# Patient Record
(demographics unavailable — no encounter records)

---

## 2024-12-24 NOTE — DISCUSSION/SUMMARY
[All Questions Answered] : Patient (and family) had all questions answered to an agreeable level of satisfaction [Interested in Proceeding] : Patient (and family) expressed understanding and interest in proceeding with the plan as outlined [de-identified] : Hopefully superficial wound in the area of the surgical incision. This did not seem to go deep, however, there is the possibility of risk for deep infection that would be a much more dangerous problem. I have started her on Duracef and I would like to see her again in 2 weeks to make sure this gets better. In the meantime, if she has any further pain or fevers I would have her go to the emergency department for initiation of IV antibiotics. She understands this is a delicate situation, especially as she had radiation prior to her surgery.  If imaging or pathology/biopsy was ordered, the patient was told to make an appointment to review findings right after all imaging is completed.   We discussed risks, benefits and alternatives. Rationale of care was reviewed and all questions were answered.

## 2024-12-24 NOTE — REASON FOR VISIT
[FreeTextEntry1] : Skin wound in the setting of: 7/27/2023 - Left proximal femoral replacement for metastatic breast cancer.

## 2024-12-24 NOTE — DISCUSSION/SUMMARY
[All Questions Answered] : Patient (and family) had all questions answered to an agreeable level of satisfaction [Interested in Proceeding] : Patient (and family) expressed understanding and interest in proceeding with the plan as outlined [de-identified] : Hopefully superficial wound in the area of the surgical incision. This did not seem to go deep, however, there is the possibility of risk for deep infection that would be a much more dangerous problem. I have started her on Duracef and I would like to see her again in 2 weeks to make sure this gets better. In the meantime, if she has any further pain or fevers I would have her go to the emergency department for initiation of IV antibiotics. She understands this is a delicate situation, especially as she had radiation prior to her surgery.  If imaging or pathology/biopsy was ordered, the patient was told to make an appointment to review findings right after all imaging is completed.   We discussed risks, benefits and alternatives. Rationale of care was reviewed and all questions were answered.

## 2024-12-24 NOTE — HISTORY OF PRESENT ILLNESS
[FreeTextEntry1] : Patient has been having approx. 2 weeks of pain in the area of her scar. She had been using different soap and it was very itchy. She was washing the area and it opened up. She went to a dermatologist who put her on antibiotics and a skin wash, though it has gotten bigger since then and more symptomatic. [___ wks] : [unfilled] week(s) ago [2] : currently ~his/her~ pain is 2 out of 10

## 2024-12-24 NOTE — DATA REVIEWED
[Imaging Present] : Present [de-identified] : X-rays today (12/24/2024), AP pelvis with Judet views, shows the proximal femoral replacement in position. There is some superior joint space preservation. There is new bone formation around the proximal portion of the femur with periosteal reaction coming towards the host bone. There does not appear to be any loosening of the stem.

## 2024-12-24 NOTE — REVIEW OF SYSTEMS
[Joint Swelling] : joint swelling [Skin Lesions] : skin lesion [Nl] : Hematologic/Lymphatic [Joint Pain] : no joint pain

## 2024-12-24 NOTE — PROCEDURE
[de-identified] : Under sterile conditions I prepped the area and stuck an 11 blade to open it further. I used a culture stick to get cultures and explore the area a bit more. It did not seem to go below the fascia. I debrided it with normal saline with sterile dressing on top. Patient tolerated the procedure well.

## 2024-12-24 NOTE — PROCEDURE
[de-identified] : Under sterile conditions I prepped the area and stuck an 11 blade to open it further. I used a culture stick to get cultures and explore the area a bit more. It did not seem to go below the fascia. I debrided it with normal saline with sterile dressing on top. Patient tolerated the procedure well.

## 2024-12-24 NOTE — PHYSICAL EXAM
[General Appearance - Well-Appearing] : Well appearing [General Appearance - Well Nourished] : well nourished [Oriented To Time, Place, And Person] : Oriented to person, place, and time [Sclera] : the sclera and conjunctiva were normal [Neck Cervical Mass (___cm)] : no neck mass was observed [Heart Rate And Rhythm] : heart rate was normal and rhythm regular [] : No respiratory distress [Abdomen Soft] : Soft [Normal Station and Gait] : gait and station were normal [FreeTextEntry1] : On exam she has some redness in the midportion of her incision with a small opening and seropurulent discharge that is not malodorous. She is able to move her hip as she always was. She has tenderness at the overlying skin but no tenderness doing deep and no tenderness at the joint itself. I was able to express more of the seropurulent drainage.

## 2024-12-24 NOTE — ADDENDUM
[FreeTextEntry1] : I, Sharan Sheikh, documented this note as a scribe on behalf of Dr. Lalo Mccrary on 12/24/2024.

## 2024-12-24 NOTE — END OF VISIT
[FreeTextEntry3] : All medical record entries made by the Scribe were at my, Dr. Lalo Mccrary, direction and personally dictated by me on 12/24/2024. I have reviewed the chart and agree that the record accurately reflects my personal performance of the history, physical exam, assessment and plan. I have also personally directed, reviewed, and agreed with the chart.

## 2024-12-24 NOTE — DATA REVIEWED
[Imaging Present] : Present [de-identified] : X-rays today (12/24/2024), AP pelvis with Judet views, shows the proximal femoral replacement in position. There is some superior joint space preservation. There is new bone formation around the proximal portion of the femur with periosteal reaction coming towards the host bone. There does not appear to be any loosening of the stem.

## 2025-01-14 NOTE — ADDENDUM
[FreeTextEntry1] : I, Sharan Sheikh, documented this note as a scribe on behalf of Dr. Lalo Mccrary on 01/14/2025.

## 2025-01-14 NOTE — END OF VISIT
[FreeTextEntry3] : All medical record entries made by the Scribe were at my, Dr. Lalo Mccrary, direction and personally dictated by me on 01/14/2025. I have reviewed the chart and agree that the record accurately reflects my personal performance of the history, physical exam, assessment and plan. I have also personally directed, reviewed, and agreed with the chart.

## 2025-01-14 NOTE — HISTORY OF PRESENT ILLNESS
[___ wks] : [unfilled] week(s) ago [2] : currently ~his/her~ pain is 2 out of 10 [FreeTextEntry1] : Patient thinks the wound is better than it was before. She has had no fevers or any pain in the area of the left thigh.

## 2025-01-14 NOTE — PHYSICAL EXAM
[General Appearance - Well-Appearing] : Well appearing [General Appearance - Well Nourished] : well nourished [Oriented To Time, Place, And Person] : Oriented to person, place, and time [Sclera] : the sclera and conjunctiva were normal [Neck Cervical Mass (___cm)] : no neck mass was observed [Heart Rate And Rhythm] : heart rate was normal and rhythm regular [] : No respiratory distress [Abdomen Soft] : Soft [Normal Station and Gait] : gait and station were normal [FreeTextEntry1] : On exam there is still the area of the open wound approximately 3 cm with some granulation tissue. It is putting out minimal serous fluid. Nothing was able to be expressed today although I continued to wash this out. She is still walking and moving her hip appropriately.

## 2025-01-14 NOTE — DISCUSSION/SUMMARY
[All Questions Answered] : Patient (and family) had all questions answered to an agreeable level of satisfaction [Interested in Proceeding] : Patient (and family) expressed understanding and interest in proceeding with the plan as outlined [de-identified] : Nonhealing wound of the left thigh. There are a number of wound care centers, and she will continue her wound treatment there. I have switched her antibiotic to Augmentin as I think this would likely be more specific for this bacterium.  I would like to see her again in 2-3 weeks. She understands she may need surgery to further clean this are to get it to heal; however, right now this just limited to where she was radiated.  If imaging or pathology/biopsy was ordered, the patient was told to make an appointment to review findings right after all imaging is completed.   We discussed risks, benefits and alternatives. Rationale of care was reviewed and all questions were answered.

## 2025-01-14 NOTE — DISCUSSION/SUMMARY
[All Questions Answered] : Patient (and family) had all questions answered to an agreeable level of satisfaction [Interested in Proceeding] : Patient (and family) expressed understanding and interest in proceeding with the plan as outlined [de-identified] : Nonhealing wound of the left thigh. There are a number of wound care centers, and she will continue her wound treatment there. I have switched her antibiotic to Augmentin as I think this would likely be more specific for this bacterium.  I would like to see her again in 2-3 weeks. She understands she may need surgery to further clean this are to get it to heal; however, right now this just limited to where she was radiated.  If imaging or pathology/biopsy was ordered, the patient was told to make an appointment to review findings right after all imaging is completed.   We discussed risks, benefits and alternatives. Rationale of care was reviewed and all questions were answered.

## 2025-01-14 NOTE — REASON FOR VISIT
[FreeTextEntry1] : Follow-up of skin wound in the setting of: 7/27/2023 - Left proximal femoral replacement for metastatic breast cancer.

## 2025-03-04 NOTE — PHYSICAL EXAM
[de-identified] : 45 yo female left thigh wound and cellulitis h/o left hip replacement> Review of symptoms with pain.  Physical examination consistent with left hip wound measures 1x1cm .  A wound culture was obtained. The patient was instructed to clean  the wound with soap and water. Continue local wound care. Will call pt if culture positive.  Follow up prn.

## 2025-03-25 NOTE — PHYSICAL EXAM
[de-identified] :  Summary:   - A 46-year-old female patient named Cherelle [id number], with a history of cancer and chronic wound to the left thigh, was examined.   Physical examination consistent with The wound measures about 3 by 3 centimeters with undermining of about 5 by 10 centimeters. The condition exhibits serious drainage, erythema with adherent necrotic tissue.   The patient wound culture tested positive; on oral antibiotic. A treatment plan includes aqua cell, referral for evaluation of hyperbaric oxygen as per the patient's request. A follow-up is scheduled in about three weeks, with potential need for debridement.

## 2025-05-29 NOTE — PHYSICAL EXAM
[de-identified] :  Summary:   - A 46-year-old female patient named Cherelle [id number], with a history of cancer and chronic wound to the left thigh, was examined.   Physical examination consistent with The wound measures about 3 by 3 centimeters with undermining of about 5 by 10 centimeters. The condition exhibits serious drainage, erythema with adherent necrotic  tissue.  A wound culture was obtained.  . A treatment plan includes aqua cell, referral for evaluation of hyperbaric oxygen as per the patient's request.  Follow up 1-2 months. Pt on chemotx.